# Patient Record
Sex: MALE | Race: WHITE | NOT HISPANIC OR LATINO | Employment: FULL TIME | URBAN - METROPOLITAN AREA
[De-identification: names, ages, dates, MRNs, and addresses within clinical notes are randomized per-mention and may not be internally consistent; named-entity substitution may affect disease eponyms.]

---

## 2021-03-23 ENCOUNTER — BULK ORDERING (OUTPATIENT)
Dept: CASE MANAGEMENT | Facility: OTHER | Age: 41
End: 2021-03-23

## 2021-03-23 DIAGNOSIS — Z23 IMMUNIZATION DUE: ICD-10-CM

## 2021-09-08 ENCOUNTER — OFFICE VISIT (OUTPATIENT)
Dept: SURGERY | Facility: CLINIC | Age: 41
End: 2021-09-08

## 2021-09-08 VITALS
BODY MASS INDEX: 25.94 KG/M2 | OXYGEN SATURATION: 98 % | DIASTOLIC BLOOD PRESSURE: 82 MMHG | WEIGHT: 208.6 LBS | TEMPERATURE: 97.8 F | HEART RATE: 105 BPM | HEIGHT: 75 IN | SYSTOLIC BLOOD PRESSURE: 140 MMHG

## 2021-09-08 DIAGNOSIS — Z01.818 PRE-OP TESTING: Primary | ICD-10-CM

## 2021-09-08 DIAGNOSIS — R10.11 RIGHT UPPER QUADRANT ABDOMINAL PAIN: Primary | ICD-10-CM

## 2021-09-08 DIAGNOSIS — K80.20 CALCULUS OF GALLBLADDER WITHOUT CHOLECYSTITIS WITHOUT OBSTRUCTION: ICD-10-CM

## 2021-09-08 PROCEDURE — 99244 OFF/OP CNSLTJ NEW/EST MOD 40: CPT | Performed by: SURGERY

## 2021-09-08 RX ORDER — PANTOPRAZOLE SODIUM 40 MG/1
40 TABLET, DELAYED RELEASE ORAL DAILY
COMMUNITY
Start: 2021-09-01

## 2021-09-08 RX ORDER — DULOXETIN HYDROCHLORIDE 30 MG/1
CAPSULE, DELAYED RELEASE ORAL
COMMUNITY

## 2021-09-08 RX ORDER — CLONAZEPAM 0.5 MG/1
0.5 TABLET ORAL 2 TIMES DAILY
COMMUNITY
Start: 2021-09-02

## 2021-09-08 RX ORDER — HYDROCODONE BITARTRATE AND ACETAMINOPHEN 7.5; 325 MG/1; MG/1
1 TABLET ORAL EVERY 6 HOURS PRN
Qty: 20 TABLET | Refills: 0 | Status: SHIPPED | OUTPATIENT
Start: 2021-09-08

## 2021-09-08 RX ORDER — AMOXICILLIN AND CLAVULANATE POTASSIUM 875; 125 MG/1; MG/1
1 TABLET, FILM COATED ORAL 2 TIMES DAILY
Qty: 10 TABLET | Refills: 0 | Status: SHIPPED | OUTPATIENT
Start: 2021-09-08 | End: 2021-09-13

## 2021-09-08 NOTE — PROGRESS NOTES
Patient: Alexey Grady    YOB: 1980    Date: 09/08/2021    Primary Care Provider: Travis Gonzales MD    Chief Complaint   Patient presents with   • Abdominal Pain     right upper       SUBJECTIVE:    History of present illness:  I saw the patient in the office today as a consultation for evaluation and treatment of right upper quadrant abdominal pain.  Per BRIANA Calles, pt has gallstones.    There is a 1 week history of right upper quadrant midepigastric abdominal discomfort that is fairly sharp in nature, now radiating into the back region associated with nausea.  Fatty meals tend to make it worse.  He is never had this problem before.    The following portions of the patient's history were reviewed and updated as appropriate: allergies, current medications, past family history, past medical history, past social history, past surgical history and problem list.    Review of Systems   Constitutional: Negative for chills, fever and unexpected weight change.   HENT: Negative for trouble swallowing and voice change.    Eyes: Negative for visual disturbance.   Respiratory: Negative for apnea, cough, chest tightness, shortness of breath and wheezing.    Cardiovascular: Negative for chest pain, palpitations and leg swelling.   Gastrointestinal: Positive for abdominal pain. Negative for abdominal distention, anal bleeding, blood in stool, constipation, diarrhea, nausea, rectal pain and vomiting.   Endocrine: Negative for cold intolerance and heat intolerance.   Genitourinary: Negative for difficulty urinating, dysuria, flank pain, scrotal swelling and testicular pain.   Musculoskeletal: Negative for back pain, gait problem and joint swelling.   Skin: Negative for color change, rash and wound.   Neurological: Negative for dizziness, syncope, speech difficulty, weakness, numbness and headaches.   Hematological: Negative for adenopathy. Does not bruise/bleed easily.   Psychiatric/Behavioral: Negative  "for confusion. The patient is not nervous/anxious.        History:  History reviewed. No pertinent past medical history.    Past Surgical History:   Procedure Laterality Date   • SINUS SURGERY     • TONSILLECTOMY AND ADENOIDECTOMY         Family History   Problem Relation Age of Onset   • Diabetes Mother        Social History     Tobacco Use   • Smoking status: Former Smoker   • Smokeless tobacco: Former User   Substance Use Topics   • Alcohol use: Never   • Drug use: Defer       Allergies:  Allergies   Allergen Reactions   • Promethazine Anaphylaxis       Medications:    Current Outpatient Medications:   •  clonazePAM (KlonoPIN) 0.5 MG tablet, Take 0.5 mg by mouth 2 (Two) Times a Day., Disp: , Rfl:   •  DULoxetine (CYMBALTA) 30 MG capsule, TAKE ONE CAPSULE BY MOUTH EVERY DAY, Disp: , Rfl:   •  pantoprazole (PROTONIX) 40 MG EC tablet, Take 40 mg by mouth Daily., Disp: , Rfl:   •  amoxicillin-clavulanate (Augmentin) 875-125 MG per tablet, Take 1 tablet by mouth 2 (Two) Times a Day for 5 days., Disp: 10 tablet, Rfl: 0  •  HYDROcodone-acetaminophen (Norco) 7.5-325 MG per tablet, Take 1 tablet by mouth Every 6 (Six) Hours As Needed for Moderate Pain ., Disp: 20 tablet, Rfl: 0    OBJECTIVE:    Vital Signs:   Vitals:    09/08/21 1439   BP: 140/82   Pulse: 105   Temp: 97.8 °F (36.6 °C)   SpO2: 98%   Weight: 94.6 kg (208 lb 9.6 oz)   Height: 190.5 cm (75\")       Physical Exam:   General Appearance:    Alert, cooperative, in no acute distress   Head:    Normocephalic, without obvious abnormality, atraumatic   Eyes:            Lids and lashes normal, conjunctivae and sclerae normal, no   icterus, no pallor, corneas clear, PERRLA   Ears:    Ears appear intact with no abnormalities noted   Throat:   No oral lesions, no thrush, oral mucosa moist   Neck:   No adenopathy, supple, trachea midline, no thyromegaly, no   carotid bruit, no JVD   Lungs:     Clear to auscultation,respirations regular, even and                  unlabored "    Heart:    Regular rhythm and normal rate, normal S1 and S2, no            murmur   Abdomen:     no masses, no organomegaly, soft non-tender, non-distended, no guarding, there is evidence of right upper quadrant tenderness, no peritoneal signs   Extremities:   Moves all extremities well, no edema, no cyanosis, no             redness   Pulses:   Pulses palpable and equal bilaterally   Skin:   No bleeding, bruising or rash   Lymph nodes:   No palpable adenopathy   Neurologic:   Cranial nerves 2 - 12 grossly intact, sensation intact      Results Review:   I reviewed the patient's new clinical results.  I reviewed the patient's new imaging results and agree with the interpretation.  I reviewed the patient's other test results and agree with the interpretation    Review of Systems was reviewed and confirmed as accurate as documented by the MA.    ASSESSMENT/PLAN:    1. Right upper quadrant abdominal pain    2. Calculus of gallbladder without cholecystitis without obstruction        I had a detailed and extensive discussion with the patient in the office and they understand that they need to undergo laparoscopic cholecystectomy with intraoperative cholangiography or possible open cholecystectomy. Full risks and benefits of operative versus nonoperative intervention were discussed with the patient and these included things such as nonresolution of symptoms and possible worsening of symptoms without surgical intervention versus infection, bleeding, open cholecystectomy, common bile duct injury, postoperative biliary leakage, need for drain placement, possible inability to perform cholangiography due to inflammation, postoperative abscess, etc with surgical intervention. The patient understands, agrees, and wishes to proceed with the surgical treatment plan as mentioned above. The patient had no questions for me at the end of the discussion.  I did draw a picture of the anatomy for the patient and used this in my informed  consent.     I discussed the patients findings and my recommendations with patient.    I did call in the patient a prescription for Vicodin and Augmentin today, gallbladder ultrasound performed in the office did show evidence of extensive cholelithiasis but no evidence of pericholecystic fluid.    Electronically signed by Dane Curtis MD  09/08/21

## 2021-09-11 ENCOUNTER — LAB (OUTPATIENT)
Dept: LAB | Facility: HOSPITAL | Age: 41
End: 2021-09-11

## 2021-09-11 DIAGNOSIS — Z01.818 PRE-OP TESTING: ICD-10-CM

## 2021-09-11 PROCEDURE — U0004 COV-19 TEST NON-CDC HGH THRU: HCPCS

## 2021-09-11 PROCEDURE — C9803 HOPD COVID-19 SPEC COLLECT: HCPCS

## 2021-09-12 LAB — SARS-COV-2 RNA NOSE QL NAA+PROBE: NOT DETECTED

## 2021-09-14 ENCOUNTER — OUTSIDE FACILITY SERVICE (OUTPATIENT)
Dept: SURGERY | Facility: CLINIC | Age: 41
End: 2021-09-14

## 2021-09-14 DIAGNOSIS — R10.11 RIGHT UPPER QUADRANT ABDOMINAL PAIN: Primary | ICD-10-CM

## 2021-09-14 PROCEDURE — 47563 LAPARO CHOLECYSTECTOMY/GRAPH: CPT | Performed by: SURGERY

## 2021-09-14 RX ORDER — HYDROCODONE BITARTRATE AND ACETAMINOPHEN 7.5; 325 MG/1; MG/1
1 TABLET ORAL EVERY 6 HOURS PRN
Qty: 15 TABLET | Refills: 0 | Status: SHIPPED | OUTPATIENT
Start: 2021-09-14

## 2021-09-27 NOTE — PROGRESS NOTES
"Patient: Alexey Grady    YOB: 1980    Date: 09/28/2021    Primary Care Provider: Travis Gonzales MD    Chief Complaint   Patient presents with   • Post-op Follow-up     lap johanna       History of present illness:  I saw the patient in the office today as a followup from their recent laparoscopic cholecystectomy which was performed 09/14/2021.  Pathology report showed cholelithiasis and chronic erosive cholecystitis.  They state that they have done well and are having no complaints.    Vital Signs:   Vitals:    09/28/21 1513   BP: 152/94   Pulse: 112   Temp: 97.7 °F (36.5 °C)   SpO2: 97%   Weight: 94.3 kg (208 lb)   Height: 190.5 cm (75\")       Physical Exam:   General Appearance:    Alert, cooperative, in no acute distress   Abdomen:     no masses, no organomegaly, soft non-tender, non-distended, no guarding, wounds are well healed     Assessment / Plan :    1. Postoperative visit        I did discuss the situation with the patient today in the office and they have done well from their recent laparoscopic cholecystectomy.  I have released the patient back to normal activity, they understand that they need to be careful about heavy lifting.  I need to see the patient back in the office only if they are having further problems, they know to call me if they are.    Electronically signed by Dane Curtis MD  09/28/21                  "

## 2021-09-28 ENCOUNTER — OFFICE VISIT (OUTPATIENT)
Dept: SURGERY | Facility: CLINIC | Age: 41
End: 2021-09-28

## 2021-09-28 VITALS
DIASTOLIC BLOOD PRESSURE: 94 MMHG | SYSTOLIC BLOOD PRESSURE: 152 MMHG | HEIGHT: 75 IN | TEMPERATURE: 97.7 F | WEIGHT: 208 LBS | OXYGEN SATURATION: 97 % | HEART RATE: 112 BPM | BODY MASS INDEX: 25.86 KG/M2

## 2021-09-28 DIAGNOSIS — Z48.89 POSTOPERATIVE VISIT: Primary | ICD-10-CM

## 2021-09-28 PROCEDURE — 99024 POSTOP FOLLOW-UP VISIT: CPT | Performed by: SURGERY

## 2022-02-23 NOTE — PROGRESS NOTES
Patient: Alexey Grady    YOB: 1980    Date: 02/24/2022    Primary Care Provider: Travis Gonzales MD    Chief Complaint   Patient presents with   • Abdominal Pain       SUBJECTIVE:    History of present illness:  Patient is s/p laparoscopic cholecystectomy which was done 09/14/2021, it was done secondary to cholelithiasis. I saw the patient in the office today as a consultation for evaluation and treatment of abdominal pain with bouts of nausea and vomiting. He states that he had a CT scan 2/7/22 at [facility].    He did have a laparoscopic cholecystectomy for treatment of chronic cholecystitis due to cholelithiasis in September 2021.  Prior to that surgery he did have an episode of right sided pneumonia, 1-2 weeks after that surgery he had a recurrence of right sided pneumonia that was treated at home with oral antibiotics.  CT scan performed earlier this month at [facility] showed some aspects of atelectasis in the right middle lobe.    He does give a history significant for sharp discomfort in the epigastric region definitely associated with reflux, there is substernal chest discomfort associated with this reflux, he does take Protonix to some relief.  CT scan also showed evidence of a very small hiatal hernia.    The following portions of the patient's history were reviewed and updated as appropriate: allergies, current medications, past family history, past medical history, past social history, past surgical history and problem list.    Review of Systems   Constitutional: Negative for chills, fever and unexpected weight change.   HENT: Negative for trouble swallowing and voice change.    Eyes: Negative for visual disturbance.   Respiratory: Negative for apnea, cough, chest tightness, shortness of breath and wheezing.    Cardiovascular: Negative for chest pain, palpitations and leg swelling.   Gastrointestinal: Positive for abdominal pain, nausea and vomiting. Negative for  abdominal distention, anal bleeding, blood in stool, constipation, diarrhea and rectal pain.   Endocrine: Negative for cold intolerance and heat intolerance.   Genitourinary: Negative for difficulty urinating, dysuria, flank pain, scrotal swelling and testicular pain.   Musculoskeletal: Negative for back pain, gait problem and joint swelling.   Skin: Negative for color change, rash and wound.   Neurological: Negative for dizziness, syncope, speech difficulty, weakness, numbness and headaches.   Hematological: Negative for adenopathy. Does not bruise/bleed easily.   Psychiatric/Behavioral: Negative for confusion. The patient is not nervous/anxious.        History:  History reviewed. No pertinent past medical history.    Past Surgical History:   Procedure Laterality Date   • LAPAROSCOPIC CHOLECYSTECTOMY     • SINUS SURGERY     • TONSILLECTOMY AND ADENOIDECTOMY         Family History   Problem Relation Age of Onset   • Diabetes Mother        Social History     Tobacco Use   • Smoking status: Former Smoker   • Smokeless tobacco: Former User   Substance Use Topics   • Alcohol use: Never   • Drug use: Defer       Allergies:  Allergies   Allergen Reactions   • Promethazine Anaphylaxis       Medications:    Current Outpatient Medications:   •  clonazePAM (KlonoPIN) 0.5 MG tablet, Take 0.5 mg by mouth 2 (Two) Times a Day., Disp: , Rfl:   •  DULoxetine (CYMBALTA) 30 MG capsule, TAKE ONE CAPSULE BY MOUTH EVERY DAY, Disp: , Rfl:   •  pantoprazole (PROTONIX) 40 MG EC tablet, Take 40 mg by mouth Daily., Disp: , Rfl:   •  HYDROcodone-acetaminophen (Norco) 7.5-325 MG per tablet, Take 1 tablet by mouth Every 6 (Six) Hours As Needed for Moderate Pain ., Disp: 20 tablet, Rfl: 0  •  HYDROcodone-acetaminophen (Norco) 7.5-325 MG per tablet, Take 1 tablet by mouth Every 6 (Six) Hours As Needed for Moderate Pain ., Disp: 15 tablet, Rfl: 0  •  losartan (COZAAR) 25 MG tablet, losartan 25 mg tablet  TAKE 1 TABLET BY MOUTH EVERY DAY, Disp: ,  "Rfl:     OBJECTIVE:    Vital Signs:   Vitals:    02/24/22 1459   BP: 128/64   Pulse: 76   Resp: 18   Temp: 97.7 °F (36.5 °C)   TempSrc: Temporal   SpO2: 99%   Weight: 100 kg (221 lb 3.2 oz)   Height: 190.5 cm (75\")       Physical Exam:   General Appearance:    Alert, cooperative, in no acute distress   Head:    Normocephalic, without obvious abnormality, atraumatic   Eyes:            Lids and lashes normal, conjunctivae and sclerae normal, no   icterus, no pallor, corneas clear, PERRLA   Ears:    Ears appear intact with no abnormalities noted   Throat:   No oral lesions, no thrush, oral mucosa moist   Neck:   No adenopathy, supple, trachea midline, no thyromegaly, no   carotid bruit, no JVD   Lungs:     Clear to auscultation,respirations regular, even and                  unlabored    Heart:    Regular rhythm and normal rate, normal S1 and S2, no            murmur, no gallop, no rub, no click   Chest Wall:    No abnormalities observed   Abdomen:     Normal bowel sounds, no masses, no organomegaly, soft        non-tender, non-distended, no guarding, there is evidence of epigastric  tenderness, no evidence of peritoneal signs, all incisions of healed nicely   Extremities:   Moves all extremities well, no edema, no cyanosis, no             redness   Pulses:   Pulses palpable and equal bilaterally   Skin:   No bleeding, bruising or rash   Lymph nodes:   No palpable adenopathy   Neurologic:   Cranial nerves 2 - 12 grossly intact, sensation intact     Results Review:   I reviewed the patient's new clinical results.  I reviewed the patient's new imaging results and agree with the interpretation.  I reviewed the patient's other test results and agree with the interpretation    Review of Systems was reviewed and confirmed as accurate as documented by the MA.    ASSESSMENT/PLAN:    1. Gastroesophageal reflux disease with esophagitis without hemorrhage    2. Epigastric pain        I did have a detailed and extensive discussion " with the patient in the office and they understand that they need to undergo upper endoscopy. Full risks and benefits of operative versus nonoperative intervention were discussed with the patient and these include bleeding and esophageal injury. The patient understands, agrees, and wishes to proceed with the surgical treatment plan as mentioned above. The patient had no questions for me at the end of the discussion.       I discussed the patients findings and my recommendations with patient.     Electronically signed by Dane Curtis MD  02/24/22 16:05 EST

## 2022-02-24 ENCOUNTER — OFFICE VISIT (OUTPATIENT)
Dept: SURGERY | Facility: CLINIC | Age: 42
End: 2022-02-24

## 2022-02-24 VITALS
SYSTOLIC BLOOD PRESSURE: 128 MMHG | RESPIRATION RATE: 18 BRPM | DIASTOLIC BLOOD PRESSURE: 64 MMHG | HEART RATE: 76 BPM | BODY MASS INDEX: 27.5 KG/M2 | WEIGHT: 221.2 LBS | HEIGHT: 75 IN | OXYGEN SATURATION: 99 % | TEMPERATURE: 97.7 F

## 2022-02-24 DIAGNOSIS — R10.13 EPIGASTRIC PAIN: ICD-10-CM

## 2022-02-24 DIAGNOSIS — K21.00 GASTROESOPHAGEAL REFLUX DISEASE WITH ESOPHAGITIS WITHOUT HEMORRHAGE: Primary | ICD-10-CM

## 2022-02-24 DIAGNOSIS — Z01.818 PRE-OP TESTING: Primary | ICD-10-CM

## 2022-02-24 PROCEDURE — 99214 OFFICE O/P EST MOD 30 MIN: CPT | Performed by: SURGERY

## 2022-02-24 RX ORDER — LOSARTAN POTASSIUM 25 MG/1
TABLET ORAL
COMMUNITY

## 2022-03-03 ENCOUNTER — TELEPHONE (OUTPATIENT)
Dept: SURGERY | Facility: CLINIC | Age: 42
End: 2022-03-03

## 2022-03-03 NOTE — TELEPHONE ENCOUNTER
Called to confirm EGD,03/08/22, Dr Curtis, @ [facility]. No answer,no voice mail. Called patient wife and confirmed

## 2022-03-05 ENCOUNTER — LAB (OUTPATIENT)
Dept: LAB | Facility: HOSPITAL | Age: 42
End: 2022-03-05

## 2022-03-05 DIAGNOSIS — Z01.818 PRE-OP TESTING: ICD-10-CM

## 2022-03-05 LAB — SARS-COV-2 RNA PNL SPEC NAA+PROBE: NOT DETECTED

## 2022-03-05 PROCEDURE — U0004 COV-19 TEST NON-CDC HGH THRU: HCPCS

## 2022-03-05 PROCEDURE — C9803 HOPD COVID-19 SPEC COLLECT: HCPCS

## 2022-03-08 ENCOUNTER — OUTSIDE FACILITY SERVICE (OUTPATIENT)
Dept: SURGERY | Facility: CLINIC | Age: 42
End: 2022-03-08

## 2022-03-08 PROCEDURE — 43239 EGD BIOPSY SINGLE/MULTIPLE: CPT | Performed by: SURGERY

## 2022-03-22 NOTE — PROGRESS NOTES
Patient: Alexey Grady    YOB: 1980    Date: 03/24/2022    Primary Care Provider: Travis Gonzales MD    Reason for Consultation: Follow-up EGD    Chief Complaint   Patient presents with   • Follow-up     EGD       History of present illness:  I saw the patient in the office today as a followup from their recent EGD with biopsy, the pathology report did show reactive gastropathy with minimal chronic gastritis and reactive squamous mucosa.  They state that they have done well and has changed his diet.  He states that has helped with his acid reflux, nausea, vomiting, and diarrhea.    He has had a previous episode of fasting and decreasing his oral intake of carbonated beverages and states he feels markedly better.  Biopsy showed no evidence of celiac disease.    The following portions of the patient's history were reviewed and updated as appropriate: allergies, current medications, past family history, past medical history, past social history, past surgical history and problem list.    Review of Systems   Constitutional: Negative for chills, fever and unexpected weight change.   HENT: Negative for trouble swallowing and voice change.    Eyes: Negative for visual disturbance.   Respiratory: Negative for apnea, cough, chest tightness, shortness of breath and wheezing.    Cardiovascular: Negative for chest pain, palpitations and leg swelling.   Gastrointestinal: Negative for abdominal distention, abdominal pain, anal bleeding, blood in stool, constipation, diarrhea, nausea, rectal pain and vomiting.   Endocrine: Negative for cold intolerance and heat intolerance.   Genitourinary: Negative for difficulty urinating, dysuria, flank pain, scrotal swelling and testicular pain.   Musculoskeletal: Negative for back pain, gait problem and joint swelling.   Skin: Negative for color change, rash and wound.   Neurological: Negative for dizziness, syncope, speech difficulty, weakness, numbness and headaches.  "  Hematological: Negative for adenopathy. Does not bruise/bleed easily.   Psychiatric/Behavioral: Negative for confusion. The patient is not nervous/anxious.        Vital Signs:   Vitals:    03/24/22 1535   BP: 102/74   Pulse: 67   Temp: 97.4 °F (36.3 °C)   TempSrc: Temporal   SpO2: 97%   Weight: 97.4 kg (214 lb 12.8 oz)   Height: 190.5 cm (75\")       Allergies:  Allergies   Allergen Reactions   • Promethazine Anaphylaxis       Medications:    Current Outpatient Medications:   •  citalopram (CeleXA) 20 MG tablet, Take 20 mg by mouth Daily., Disp: , Rfl:   •  clonazePAM (KlonoPIN) 0.5 MG tablet, Take 0.5 mg by mouth 2 (Two) Times a Day., Disp: , Rfl:   •  DULoxetine (CYMBALTA) 30 MG capsule, TAKE ONE CAPSULE BY MOUTH EVERY DAY, Disp: , Rfl:   •  HYDROcodone-acetaminophen (Norco) 7.5-325 MG per tablet, Take 1 tablet by mouth Every 6 (Six) Hours As Needed for Moderate Pain ., Disp: 20 tablet, Rfl: 0  •  HYDROcodone-acetaminophen (Norco) 7.5-325 MG per tablet, Take 1 tablet by mouth Every 6 (Six) Hours As Needed for Moderate Pain ., Disp: 15 tablet, Rfl: 0  •  losartan (COZAAR) 25 MG tablet, losartan 25 mg tablet  TAKE 1 TABLET BY MOUTH EVERY DAY, Disp: , Rfl:   •  pantoprazole (PROTONIX) 40 MG EC tablet, Take 40 mg by mouth Daily., Disp: , Rfl:     Physical Exam:   General Appearance:    Alert, cooperative, in no acute distress   Abdomen:     no masses, no organomegaly, soft non-tender, non-distended, no guarding, wounds are well healed, no evidence of recurrent hernia, no peritoneal signs   Chest:      Clear toausculation            Cor:  Regular rate and rhythm      Results Review:   I reviewed the patient's new clinical results.  I reviewed the patient's new imaging results and agree with the interpretation.  I reviewed the patient's other test results and agree with the interpretation    Review of Systems was reviewed and confirmed as accurate as documented by the MA.    Assessment / Plan:    1. Epigastric pain  "       I did discuss the situation with the patient today in the office and they have done well from their recent EGD with biopsy. I have told the patient he needs no further intervention at this time, I would like for him to continue to watch his diet, he knows to see me back in the office if he has any further problems..    Electronically signed by Dane Curtis MD  03/24/22

## 2022-03-24 ENCOUNTER — OFFICE VISIT (OUTPATIENT)
Dept: SURGERY | Facility: CLINIC | Age: 42
End: 2022-03-24

## 2022-03-24 VITALS
TEMPERATURE: 97.4 F | OXYGEN SATURATION: 97 % | HEART RATE: 67 BPM | SYSTOLIC BLOOD PRESSURE: 102 MMHG | WEIGHT: 214.8 LBS | BODY MASS INDEX: 26.71 KG/M2 | HEIGHT: 75 IN | DIASTOLIC BLOOD PRESSURE: 74 MMHG

## 2022-03-24 DIAGNOSIS — R10.13 EPIGASTRIC PAIN: Primary | ICD-10-CM

## 2022-03-24 PROCEDURE — 99213 OFFICE O/P EST LOW 20 MIN: CPT | Performed by: SURGERY

## 2022-03-24 RX ORDER — CITALOPRAM 20 MG/1
20 TABLET ORAL DAILY
COMMUNITY
Start: 2022-02-18

## 2023-02-08 ENCOUNTER — HOSPITAL ENCOUNTER (EMERGENCY)
Facility: HOSPITAL | Age: 43
Discharge: HOME | End: 2023-02-08
Attending: EMERGENCY MEDICINE | Admitting: EMERGENCY MEDICINE
Payer: OTHER MISCELLANEOUS

## 2023-02-08 ENCOUNTER — APPOINTMENT (OUTPATIENT)
Dept: CT IMAGING | Facility: HOSPITAL | Age: 43
End: 2023-02-08
Payer: OTHER MISCELLANEOUS

## 2023-02-08 ENCOUNTER — APPOINTMENT (OUTPATIENT)
Dept: GENERAL RADIOLOGY | Facility: HOSPITAL | Age: 43
End: 2023-02-08
Payer: OTHER MISCELLANEOUS

## 2023-02-08 VITALS
DIASTOLIC BLOOD PRESSURE: 86 MMHG | RESPIRATION RATE: 18 BRPM | HEART RATE: 84 BPM | OXYGEN SATURATION: 99 % | WEIGHT: 210 LBS | TEMPERATURE: 98.4 F | HEIGHT: 75 IN | SYSTOLIC BLOOD PRESSURE: 141 MMHG | BODY MASS INDEX: 26.11 KG/M2

## 2023-02-08 DIAGNOSIS — M25.511 RIGHT SHOULDER PAIN, UNSPECIFIED CHRONICITY: ICD-10-CM

## 2023-02-08 DIAGNOSIS — S01.311A LACERATION OF RIGHT EAR, INITIAL ENCOUNTER: Primary | ICD-10-CM

## 2023-02-08 PROCEDURE — 99283 EMERGENCY DEPT VISIT LOW MDM: CPT

## 2023-02-08 PROCEDURE — 72125 CT NECK SPINE W/O DYE: CPT

## 2023-02-08 PROCEDURE — 70450 CT HEAD/BRAIN W/O DYE: CPT

## 2023-02-08 PROCEDURE — 73030 X-RAY EXAM OF SHOULDER: CPT

## 2023-02-08 RX ORDER — ACETAMINOPHEN 325 MG/1
975 TABLET ORAL ONCE
Status: COMPLETED | OUTPATIENT
Start: 2023-02-08 | End: 2023-02-08

## 2023-02-08 RX ADMIN — ACETAMINOPHEN 975 MG: 325 TABLET, FILM COATED ORAL at 16:16

## 2023-02-08 NOTE — DISCHARGE INSTRUCTIONS
Return to the emergency room for any worsening symptoms, follow-up with primary care in 10 days for suture removal.  Tylenol Motrin for pain.  Avoid soaking the area, wash only with gentle soap and water to avoid pulling out any suture.

## 2023-02-08 NOTE — ED PROVIDER NOTES
"Subjective  History of Present Illness:    This is a 42-year-old male presents emergency room today for chief complaint of head injury/ear laceration.  Patient reports that he was at work today when he was hauling brush, got a grapevine caught around his foot causing him to trip and fall, landing on the right side of his head on a stump.  Patient reports a laceration to the top portion of the right ear.  He is alert and oriented x4.  Reports headache after, however no vomiting.  Does not reporting visual disturbances.  Denies any loss of consciousness.  He is not on any anticoagulation.  He reports that he is up-to-date on his tetanus.  He additionally reports right shoulder pain that began after the fall as well.  He has full range of motion of this.  He denies any neck or back pain.      Nurses Notes reviewed and agree, including vitals, allergies, social history and prior medical history.     REVIEW OF SYSTEMS: All systems reviewed and not pertinent unless noted.  Review of Systems   Skin:        Reports laceration of upper right ear.   Neurological: Positive for headaches.   All other systems reviewed and are negative.      Past Medical History:   Diagnosis Date   • GERD (gastroesophageal reflux disease)    • Hypertension        Allergies:    Promethazine      Past Surgical History:   Procedure Laterality Date   • LAPAROSCOPIC CHOLECYSTECTOMY     • SINUS SURGERY     • TONSILLECTOMY AND ADENOIDECTOMY           Social History     Socioeconomic History   • Marital status:    Tobacco Use   • Smoking status: Former   • Smokeless tobacco: Former   Vaping Use   • Vaping Use: Never used   Substance and Sexual Activity   • Alcohol use: Never   • Drug use: Never   • Sexual activity: Defer         Family History   Problem Relation Age of Onset   • Diabetes Mother        Objective  Physical Exam:  /86   Pulse 80   Temp 98.4 °F (36.9 °C) (Oral)   Resp 18   Ht 190.5 cm (75\")   Wt 95.3 kg (210 lb)   SpO2 99% "   BMI 26.25 kg/m²      Physical Exam  Vitals and nursing note reviewed.   Constitutional:       General: He is not in acute distress.     Appearance: Normal appearance. He is normal weight. He is not ill-appearing, toxic-appearing or diaphoretic.   HENT:      Head: Normocephalic and atraumatic.      Right Ear: Tympanic membrane, ear canal and external ear normal. There is no impacted cerumen.      Left Ear: Tympanic membrane, ear canal and external ear normal. There is no impacted cerumen.      Ears:      Comments: There is a 1 cm jagged laceration noted to the superior aspect of the right ear.  There is a small piece of exposed cartilage.     Nose: Nose normal. No congestion or rhinorrhea.      Mouth/Throat:      Mouth: Mucous membranes are moist.      Pharynx: Oropharynx is clear.   Eyes:      General:         Right eye: No discharge.         Left eye: No discharge.      Extraocular Movements: Extraocular movements intact.      Conjunctiva/sclera: Conjunctivae normal.      Pupils: Pupils are equal, round, and reactive to light.   Cardiovascular:      Rate and Rhythm: Normal rate and regular rhythm.      Pulses: Normal pulses.      Heart sounds: Normal heart sounds.   Pulmonary:      Effort: Pulmonary effort is normal. No respiratory distress.      Breath sounds: Normal breath sounds. No stridor. No wheezing, rhonchi or rales.   Chest:      Chest wall: No tenderness.   Abdominal:      General: There is no distension.      Palpations: Abdomen is soft.      Tenderness: There is no abdominal tenderness. There is no guarding.   Musculoskeletal:         General: Tenderness present. No swelling or deformity. Normal range of motion.      Cervical back: Normal range of motion and neck supple. No rigidity or tenderness.      Comments: Tenderness palpated to the anterior aspect of the right shoulder.  Patient exhibits full range of motion of bilateral upper extremities.  No clavicular tenderness palpated bilaterally.    Skin:     General: Skin is warm and dry.      Capillary Refill: Capillary refill takes less than 2 seconds.      Findings: No bruising.   Neurological:      General: No focal deficit present.      Mental Status: He is alert and oriented to person, place, and time.      Motor: No weakness.      Coordination: Coordination normal.   Psychiatric:         Mood and Affect: Mood normal.         Behavior: Behavior normal.         Thought Content: Thought content normal.         Judgment: Judgment normal.             Laceration Repair    Date/Time: 2/8/2023 5:13 PM  Performed by: aSndro Bardales PA-C  Authorized by: Prem Blanc DO     Consent:     Consent obtained:  Verbal    Consent given by:  Patient    Risks, benefits, and alternatives were discussed: yes      Risks discussed:  Infection, pain, poor cosmetic result and poor wound healing    Alternatives discussed:  No treatment  Universal protocol:     Procedure explained and questions answered to patient or proxy's satisfaction: yes      Patient identity confirmed:  Verbally with patient  Anesthesia:     Anesthesia method:  Nerve block    Block location:  Superior auricular    Needle gauge: 23.    Block anesthetic:  Lidocaine 2% w/o epi    Block injection procedure:  Anatomic landmarks identified, anatomic landmarks palpated, negative aspiration for blood, introduced needle and incremental injection    Block outcome:  Anesthesia achieved  Laceration details:     Location:  Ear    Ear location:  R ear    Length (cm):  1  Exploration:     Contaminated: no    Treatment:     Area cleansed with:  Chlorhexidine    Amount of cleaning:  Standard    Irrigation solution:  Sterile water    Irrigation volume:  200    Irrigation method:  Pressure wash    Visualized foreign bodies/material removed: no      Layers repaired: dermis and cartilage.  Skin repair:     Repair method:  Sutures    Suture size:  5-0    Suture material:  Nylon    Suture technique:  Simple interrupted     Number of sutures:  5  Approximation:     Approximation:  Close  Repair type:     Repair type:  Simple  Post-procedure details:     Dressing:  Open (no dressing)    Procedure completion:  Tolerated well, no immediate complications        ED Course:    ED Course as of 02/08/23 1831   Wed Feb 08, 2023   1620 We will obtain CT head noncontrast, CT of the neck.  Patient consents for laceration repair with sutures. [JR]      ED Course User Index  [JR] Sandro Bardales PA-C       Lab Results (last 24 hours)     ** No results found for the last 24 hours. **           CT Head Without Contrast    Result Date: 2/8/2023  FINAL REPORT TECHNIQUE: Axial images through the brain were performed by computed tomography. This study was performed with techniques to keep radiation doses as low as reasonably achievable (ALARA). Individualized dose reduction techniques using automated exposure control or adjustment of mA and/or kV according to the patient's size were employed. CLINICAL HISTORY: fall, hit head FINDINGS: The ventricles are normal in size.  There is no extra-axial fluid or midline shift.  There is no evidence of acute hemorrhage.  No mass lesion is identified.  No acute sinus abnormality is seen.  There are no fractures.     Impression: Unremarkable. Authenticated and Electronically Signed by Nabor Weiner M.D. on 02/08/2023 06:04:40 PM    CT Cervical Spine Without Contrast    Result Date: 2/8/2023  FINAL REPORT TECHNIQUE: Thin section axial images were obtained through the cervical spine without contrast.  Coronal and sagittal reconstructed images were then provided. CLINICAL HISTORY: fall, struck head FINDINGS: There is normal alignment and curvature.  Prevertebral soft tissues are normal.  There is no fracture.  There is no significant degenerative disease.     Impression: Unremarkable. Authenticated and Electronically Signed by Nabor Weiner M.D. on 02/08/2023 06:13:04 PM         [facility]    Initial impression of presenting illness:    This is a 42-year-old male presents emergency room today for chief complaint of head injury/ear laceration.  Patient reports that he was at work today when he was hauling brush, got a grapevine caught around his foot causing him to trip and fall, landing on the right side of his head on a stump.  Patient reports a laceration to the top portion of the right ear.  He is alert and oriented x4.  Reports headache after, however no vomiting.  Does not reporting visual disturbances.  Denies any loss of consciousness.  He is not on any anticoagulation.  He reports that he is up-to-date on his tetanus.  He additionally reports right shoulder pain that began after the fall as well.  He has full range of motion of this.  He denies any neck or back pain.    DDX: includes but is not limited to: Laceration, abrasion, contusion, intracranial normality    Patient arrives hemodynamically stable, afebrile, nontoxic-appearing, blood pressure of 141/86.  With vitals interpreted by myself.     Pertinent features from physical exam: There is a 1 cm jagged laceration on the superior aspect of the right ear.  Tenderness palpated the anterior aspect the right shoulder.  Lungs clear to auscultation bilaterally.  Pupils PEERLA.  Extraocular movements intact.  Head is otherwise atraumatic.  No evidence of any hemotympanum bilaterally.  Canals are clear in the ears bilaterally.  Patient exhibits full range of motion of bilateral upper extremities.    Initial diagnostic plan: CT of the head without contrast, CT C-spine, right shoulder plain film    Results from initial plan were reviewed and interpreted by me revealing plain film of the shoulder as interpreted by me with no acute osseous abnormality or dislocation.  CT of the head without contrast and CT C-spine as interpreted by radiology without any acute abnormality    Diagnostic information from other sources: N/A    Interventions / Re-evaluation: Given Tylenol for shoulder pain and ear  pain.  Laceration repaired without any complication.  Total of 5 sutures placed.    Results/clinical rationale were discussed with patient bedside.  Discussed need to follow-up with primary care for suture removal.  Patient was counseled on wound care and signs and symptoms of infection to watch out for.  Patient is understanding of return precautions.  Will follow up with primary care for suture removal.      Consultations/Discussion of results with other physicians: N/A    Disposition plan: Discharge home.  Follow-up with primary care.  Will follow-up with primary care for suture removal.  Discussed supportive care measures for headaches and shoulder pain.  Return precautions discussed.  Discharged home in good condition.  -----    Final diagnoses:   Laceration of right ear, initial encounter   Right shoulder pain, unspecified chronicity        Sandro Bardales PA-C  02/08/23 [id number]

## 2024-06-13 ENCOUNTER — TRANSCRIBE ORDERS (OUTPATIENT)
Dept: ADMINISTRATIVE | Facility: HOSPITAL | Age: 44
End: 2024-06-13
Payer: COMMERCIAL

## 2024-06-13 DIAGNOSIS — N50.9 DISORDER OF MALE GENITAL ORGANS: Primary | ICD-10-CM

## 2024-06-13 DIAGNOSIS — M54.50 LOW BACK PAIN, UNSPECIFIED BACK PAIN LATERALITY, UNSPECIFIED CHRONICITY, UNSPECIFIED WHETHER SCIATICA PRESENT: Primary | ICD-10-CM

## 2024-06-20 ENCOUNTER — HOSPITAL ENCOUNTER (OUTPATIENT)
Dept: ULTRASOUND IMAGING | Facility: HOSPITAL | Age: 44
Discharge: HOME | End: 2024-06-20
Admitting: FAMILY MEDICINE
Payer: COMMERCIAL

## 2024-06-20 DIAGNOSIS — N50.9 DISORDER OF MALE GENITAL ORGANS: ICD-10-CM

## 2024-06-20 PROCEDURE — 76870 US EXAM SCROTUM: CPT

## 2024-06-29 ENCOUNTER — HOSPITAL ENCOUNTER (OUTPATIENT)
Dept: MRI IMAGING | Facility: HOSPITAL | Age: 44
Discharge: HOME | End: 2024-06-29
Admitting: ORTHOPAEDIC SURGERY
Payer: COMMERCIAL

## 2024-06-29 DIAGNOSIS — M54.50 LOW BACK PAIN, UNSPECIFIED BACK PAIN LATERALITY, UNSPECIFIED CHRONICITY, UNSPECIFIED WHETHER SCIATICA PRESENT: ICD-10-CM

## 2024-06-29 PROCEDURE — 72148 MRI LUMBAR SPINE W/O DYE: CPT

## 2025-03-12 ENCOUNTER — TELEPHONE (OUTPATIENT)
Dept: SURGERY | Facility: CLINIC | Age: 45
End: 2025-03-12
Payer: COMMERCIAL